# Patient Record
Sex: FEMALE | Race: OTHER | NOT HISPANIC OR LATINO | URBAN - METROPOLITAN AREA
[De-identification: names, ages, dates, MRNs, and addresses within clinical notes are randomized per-mention and may not be internally consistent; named-entity substitution may affect disease eponyms.]

---

## 2017-02-09 ENCOUNTER — EMERGENCY (EMERGENCY)
Facility: HOSPITAL | Age: 27
LOS: 1 days | Discharge: HOME | End: 2017-02-09
Attending: EMERGENCY MEDICINE | Admitting: EMERGENCY MEDICINE
Payer: COMMERCIAL

## 2017-02-09 VITALS
TEMPERATURE: 98 F | DIASTOLIC BLOOD PRESSURE: 79 MMHG | HEART RATE: 67 BPM | SYSTOLIC BLOOD PRESSURE: 117 MMHG | RESPIRATION RATE: 18 BRPM | OXYGEN SATURATION: 99 %

## 2017-02-09 VITALS
OXYGEN SATURATION: 99 % | DIASTOLIC BLOOD PRESSURE: 81 MMHG | RESPIRATION RATE: 18 BRPM | TEMPERATURE: 98 F | HEART RATE: 71 BPM | SYSTOLIC BLOOD PRESSURE: 130 MMHG

## 2017-02-09 DIAGNOSIS — M25.562 PAIN IN LEFT KNEE: ICD-10-CM
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
## 2017-02-09 DIAGNOSIS — S89.92XA UNSPECIFIED INJURY OF LEFT LOWER LEG, INITIAL ENCOUNTER: ICD-10-CM
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
## 2017-02-09 PROCEDURE — 73562 X-RAY EXAM OF KNEE 3: CPT | Mod: 26,LT

## 2017-02-09 PROCEDURE — 99283 EMERGENCY DEPT VISIT LOW MDM: CPT | Mod: 25

## 2017-02-09 PROCEDURE — 73562 X-RAY EXAM OF KNEE 3: CPT

## 2017-02-09 PROCEDURE — 99283 EMERGENCY DEPT VISIT LOW MDM: CPT

## 2017-02-09 RX ORDER — TRAMADOL HYDROCHLORIDE 50 MG/1
1 TABLET ORAL
Qty: 10 | Refills: 0 | OUTPATIENT
Start: 2017-02-09 | End: 2017-02-14

## 2017-02-09 RX ORDER — IBUPROFEN 200 MG
1 TABLET ORAL
Qty: 21 | Refills: 0 | OUTPATIENT
Start: 2017-02-09 | End: 2017-02-16

## 2017-02-09 NOTE — ED PROVIDER NOTE - OBJECTIVE STATEMENT
25 yo female accidentally tripped and fell on her way to work today. Pt states she was going down the stairs and fell on her left knee, was not able to ambulate after due to left knee pain. Pt denies any other injuries, denies LOC, denies paretshesia top her injured LE

## 2017-02-09 NOTE — ED PROVIDER NOTE - MEDICAL DECISION MAKING DETAILS
25 yo female in the ER after accidental fall yesterday, c/o left knee pain and has decreased ROM to her left knee due to pain. no obvious deformity, pending Xray.

## 2017-02-09 NOTE — ED PROVIDER NOTE - PROGRESS NOTE DETAILS
results of Xray discussed with pt, f/u with an orthopedist for further evaluation and treatment has been scheduled.

## 2017-02-09 NOTE — ED ADULT NURSE NOTE - OBJECTIVE STATEMENT
Pt comes to er s/p slipping on stairs with cc of left knee pain. + swelling and bruising. She is able to bear weight though it's painful. Denies paresthesias or other injuries.

## 2017-02-09 NOTE — ED PROVIDER NOTE - MUSCULOSKELETAL, MLM
Spine appears normal, range of motion is not limited,  no obvious deformity to left knee, generalized tenderness over the joint, distal pulses normal, no discolorations, no pain below or above the injured knee. alll other extremities- normal exam

## 2017-02-13 PROBLEM — Z00.00 ENCOUNTER FOR PREVENTIVE HEALTH EXAMINATION: Status: ACTIVE | Noted: 2017-02-13

## 2017-02-24 ENCOUNTER — APPOINTMENT (OUTPATIENT)
Dept: ORTHOPEDIC SURGERY | Facility: CLINIC | Age: 27
End: 2017-02-24